# Patient Record
Sex: FEMALE | Race: WHITE
[De-identification: names, ages, dates, MRNs, and addresses within clinical notes are randomized per-mention and may not be internally consistent; named-entity substitution may affect disease eponyms.]

---

## 2019-02-28 ENCOUNTER — HOSPITAL ENCOUNTER (OUTPATIENT)
Dept: HOSPITAL 44 - POD | Age: 49
End: 2019-02-28
Attending: PODIATRIST
Payer: COMMERCIAL

## 2019-02-28 DIAGNOSIS — M79.675: ICD-10-CM

## 2019-02-28 DIAGNOSIS — M79.674: ICD-10-CM

## 2019-02-28 DIAGNOSIS — L60.0: Primary | ICD-10-CM

## 2019-02-28 PROCEDURE — 11750 EXCISION NAIL&NAIL MATRIX: CPT

## 2019-03-05 NOTE — OP CLINIC PROGRESS NOTE
SUBJECTIVE:  Libertad Lloyd is a 49-year-old female who presented for painful 
bilateral great toenail areas. The pain was not located on the sides of the nail
but rather with direct pressure over the nail and made it very uncomfortable in 
her shoes. The patient has thick toenails that are painful and she would like 
them removed permanently. The patient has a history of having one of them 
removed in the past but it grew back still having the same pain. The patient 
does not admit to any fevers, chills, nausea, vomiting, shortness of breath or 
chest pain at this time. 



OBJECTIVE: 

Vitals: Temperature 36.9 degrees Celsius, blood pressure 148/93, heart rate 73, 
respiration rate 18, pain 10/10. 

Vascular: Palpable pulses, DP and PT bilaterally. Capillary refill time was less
than 3 seconds to the great toe bilaterally.  No edema is noted bilaterally.

Dermatologic: There is no obvious erythema or irritation noted on the medial or 
lateral borders of the great toenail, bilateral great toes. There is no open 
lesions nor hyperkeratoses or other skin lesions noted bilaterally.

Musculoskeletal: There is pain on palpation with direct palpation on top of the 
great toenail on both great toes. This is quite tender to the patient. The 
patient does not have any other gross abnormalities noted. Muscle strength is 
5/5 about the ankle and subtalar joint bilaterally. 

Neurologic: Light touch sensation is intact to the toes bilaterally. 



ASSESSMENT AND PLAN:    

1.  Onychomycosis, bilateral great toes.



The risks and benefits of a procedure for a total nail avulsion of bilateral 
great toenails with chemical matrixectomy was discussed with the patient that 
include but are not limited to bleeding and infection. The patient understood 
the risks and benefits and has agreed both by written and verbal consent to go 
forward with the procedure at this time to remove both great toenails 
permanently. She understands that the healing time will be lengthened slightly 
as well. The patient signed the consent and it was placed in the chart. 



PROCEDURE #1:  Total nail avulsion bilateral great toenails with chemical 
matrixectomy. An alcohol swab was utilized to clean the bilateral great toes. 6 
mL of a 1:1 mix of 2% lidocaine plain and 0.5% Marcaine plain were injected in 
each toe totaling 12 mL between the 2 toes. Anesthesia was obtained about both 
toes. A Betadine swab was used to clean both great toes completely. The great 
toes were both exsanguinated and a tourniquet was applied to the base of the 
great toes. Beginning with the left great toe the nail was loosened from its 
eponychium and nail plate borders. The toenail was removed in its entirety in 1 
piece. This was then performed on the right great toenail as well in an 
identical fashion. The wound sites were flushed with a copious amount of normal 
saline and it was confirmed that there was no other nail present in the area. At
this time after washing with a copious amount of normal saline to both great 
toes phenol was then applied with cotton tip applicators in increments of 45 
seconds, 30 seconds, 30 seconds, and a final 30 seconds, totaling 4 applications
to the left great toenail base as well as subsequently performed again on the 
right side. A copious amount of 70% isopropyl alcohol was utilized on the left 
great toenail bed immediately after phenol and prior to application of phenol on
the right great toenail bed. It was also used to then rinse out and dilute the 
right great toe area after that was performed on the right side. Hemostasis was 
then obtained with pressure. The tourniquets were removed and dressings were 
applied consisting of triple antibiotic ointment, 4x4 gauze, 2-inch Maria E and 1-
inch Coban beginning on the great toes and extending onto the distal forefoot to
help hold it on the toe. The patient tolerated the procedures well. No further 
questions were had by the patient. Aftercare instructions were given both 
verbally and by instruction sheet. It should be noted that there was slight 
increased bleeding on the right great toe after she was bandaged so we removed 
the bandages and held pressure a little bit longer and bleeding was very well 
controlled at that time and redressed in the same fashion as above on the right 
great toe. 



The patient had no further questions and the patient is to return to clinic in 1
week after this visit and we will follow up to make sure she is healing 
appropriately. We will then see her 2 weeks later after that and adjust 
instructions as needed for her bandages. The patient knows she may need to do 
bandages with antibiotic ointment and a Band-Aid possibly twice daily but 
definitely at least once daily. The patient understands the rules for showering 
or Epsom salt soaks with regards to washing the toe at the appropriate time and 
she also knows that there are no baths allowed at this time with her feet. 







HAYLEE Jacques.P.M.

(Dictated/Not Signed)



[place]

D:  03/05/19

T:  03/05/19

Job#:  [id number] 

MTDD

## 2019-03-07 ENCOUNTER — HOSPITAL ENCOUNTER (OUTPATIENT)
Dept: HOSPITAL 44 - POD | Age: 49
End: 2019-03-07
Attending: PODIATRIST
Payer: COMMERCIAL

## 2019-03-07 DIAGNOSIS — L03.032: Primary | ICD-10-CM

## 2019-03-07 DIAGNOSIS — Z48.817: ICD-10-CM

## 2019-03-07 DIAGNOSIS — L03.031: ICD-10-CM

## 2019-03-07 PROCEDURE — 99024 POSTOP FOLLOW-UP VISIT: CPT

## 2019-03-08 NOTE — OP CLINIC PROGRESS NOTE
SUBJECTIVE:  Libertad Lloyd is a 49-year-old female who presents today with a 
lot of pain in both great toes. She is 1 week status post bilateral great toe 
total nail avulsions with chemical matrixectomy to both. The patient has been 
doing dressing changes per instructions, however, she has increased in her pain 
and states that she is having some drainage and purulence from the great toes. 
She is in significant pain and in tears when she comes into the office today. 
The patient does not admit to any fevers, chills, nausea, vomiting, shortness of
breath or chest pain or any other complications at home that would cause this. 



OBJECTIVE: 

Vitals: Temperature 98.1 degrees Fahrenheit, heart rate 78, respiration rate 20,
blood pressure 123/91, O2 saturation 98% on room air. 

Vascular: Palpable pulses, DP and PT bilaterally. Capillary refill time was less
than 3 seconds to the great toe bilaterally.  There is fairly moderate edema 
noted in the bilateral great toes mainly at the eponychium area of both great 
toenail proximal edges. 

Dermatologic: There is erythema and swelling noted and warmth to bilateral great
toes more near the proximal edge of the wound base. There is mild drainage that 
is clear/white from certain areas in the nail bed on bilateral great toenail 
beds. We are unable to hardly express due to the amount of pain and tight 
swelling at the eponychium area bilaterally. There are no other concerning areas
on bilateral feet at this time. There is perhaps mild malodor noted bilateral 
great toes. The left seems a little bit worse than the right. 

Musculoskeletal: There is severe pain on palpation noted with direct palpation 
to the nail bed bilateral great toes as well as especially at the area just 
proximal to the nail bed at the eponychium bilaterally, left greater than right.
The area mentioned appears to be very full of swelling/fluid. The nail beds also
have a large amount of crusted material that looks to be blocking the drainage 
underneath the eponychium. This may be harboring infection due to the appearance
of the wounds in the toes. Muscle strength is 5/5 about the ankle and subtalar 
joint bilaterally. 

Neurologic: Light touch sensation is intact to the toes bilateral.



ASSESSMENT AND PLAN:    

1.  Seroma versus abscess, bilateral great toes.

2.  Cellulitis, bilateral great toes.

3.  One week status post bilateral great toenail total nail avulsion with 
chemical matrixectomy. 



The condition of the patient was discussed with her in depth today and I believe
that she is one of the few who returns with either infection or a clogged area 
under the eponychium where the crust in the nailbed from the previous chemical 
matrixectomy seems to have blocked drainage from underneath the eponychium area 
causing a seroma or abscess to form. Due to how painful and obviously irritated 
and possible infection noted to bilateral great toes I believe it was important 
to go ahead and numb up the toes and clean up the nailbed from the crusted 
material and drain any fluid with a needle or underlying the eponychium 
bilateral great toes. The patient had risks and benefits of this incision and 
drainage of bilateral great toes discussed that include but are not limited to 
bleeding and infection. The patient signed consent for bilateral great toe 
incision and drainage bedside procedures, and the consent was placed in the 
chart. The patient had no other questions and wanted this done today as soon as 
possible.



PROCEDURE #1:  An alcohol swab was utilized to clean the base of the left great 
toe and approximately 6 mL of a 1:1 mix of 2% lidocaine plain and 0.5% Marcaine 
plain were injected into the left great toe. Anesthesia was obtained and the toe
was then cleansed with Betadine prep. The toe was then debrided of crusted 
material and probed underneath the eponychium and I did not find any obvious 
drainage of purulence under there. I did not notice any obvious large seroma, 
either. There was continued strong edema in the tissue in the eponychium and a 
25 gauge needle was utilized to puncture this area in 2 or 3 places and with 
palpation and squeezing the fluid inside was expressed and the swelling came 
down. The patient tolerated the procedure well. The wound site was then flushed 
with a copious amount of normal saline and then dressed first with a small piece
of Adaptic that covered the wound base and was pressed underneath the eponychium
to allow drainage and hold that open. This was then dressed with triple 
antibiotic ointment over the top and 4-inch gauze, 2-inch Maria E and 1-inch Coban
starting on the toe and extending onto the distal forefoot to hold it on. The 
patient tolerated the procedure well. Bleeding was controlled with pressure for 
a couple minutes. The patient felt much better at this time. 



PROCEDURE #2:  The identical procedure was performed on the right great toe as 
procedure #1. The patient tolerated that procedure as well and the same findings
were found there as the first without any obvious purulence. 



Due to the amount of pain the patient has been having as well as possible 
infection, 2 prescriptions were sent with the patient including Augmentin 
875/125 mg 1 tablet by mouth twice daily for 10 days as well as tramadol. I just
received word that the pharmacy would not accept the tramadol prescription 
because I am not under Medicaid yet and therefore a separate prescription has 
been sent in by a primary care provider here, Dr. Diaz. That prescription should
be changed to tramadol 50 mg 1-2 tablets by mouth every 6 hours as needed for 
pain. The patient should receive 20 of them. 



The patient feels much better at the end of our visit and is grateful for how we
were able to add her on this morning and take care of her. She denies any other 
pain or problems at this time and she will return to clinic in 1 week or 
slightly less at the health clinic next door in order to have a follow-up and 
make sure she is doing better. The patient was instructed to remove the Adaptic 
nonadherent gauze tomorrow sometime in the afternoon or evening and may wash her
toes daily with soap and water and dry it and apply antibiotic ointment and a 
Band-Aid daily. The patient understands this and we will see her in about 1 week
or slightly less. 









LONNIE JacquesPYanyMYany

(Dictated/Not Signed)



[place]

D:  03/07/19

T:  03/08/19

Job#:  [id number] 

MTDD

## 2019-03-21 ENCOUNTER — HOSPITAL ENCOUNTER (OUTPATIENT)
Dept: HOSPITAL 44 - POD | Age: 49
End: 2019-03-21
Attending: PODIATRIST
Payer: COMMERCIAL

## 2019-03-21 DIAGNOSIS — G89.28: ICD-10-CM

## 2019-03-21 DIAGNOSIS — T81.89XD: Primary | ICD-10-CM

## 2019-03-21 PROCEDURE — 11720 DEBRIDE NAIL 1-5: CPT

## 2019-03-25 NOTE — OP CLINIC PROGRESS NOTE
SUBJECTIVE:  Libertad Lloyd is a 49-year-old female presenting today for 
follow-up of bilateral great toenail avulsions with chemical matrixectomy. She 
has been having delayed wound healing and continues to have extensive severe 
pain on the right great toe but some pain noted still on the left toe. She is 
slow to heal. She does not admit to any fevers, chills, nausea, vomiting, 
shortness of breath or chest pain. She admits she is having a little bit of 
issues at work with her boss having a hard time seeing her hurting all the time.
She struggles to work but is still continuing to work on her feet several hours 
a day. 



OBJECTIVE: 

Vitals: Temperature 98.2 degrees Fahrenheit, heart rate 101, respiration rate 
20, blood pressure 140/86. Pain: Right is 7/10, left great toe is 3/10. 

Vascular: 2+ DP pulses bilaterally, and between 1 and 2+ PT pulses bilaterally. 
Capillary refill time was less than 3 seconds to the toes bilaterally. Mild 
edema is still noted at the bilateral hallux, especially on the proximal lateral
portion of the eponychium of the right hallux. 

Dermatologic: There is no erythema noted but there is slight red discoloration 
due to healing tissues underneath/irritation. This is noted at the proximal 
eponychium of both great toes. The wound bases of the nail beds are more raw 
still today which is better than before. They are not scabbing and they do not 
have any evidence of purulence or malodor noted. The left appears to be looking 
a little more healed this time as well as the right. There is mild yellow slough
which was debrided today again. [facility]

Musculoskeletal: There is still pain on palpation noted on bilateral great 
toenail beds especially at the proximal lateral aspect of the eponychium of the 
right great toe. The pain is overall improved today, though, I believe since 
last time. There are no other gross abnormalities noted, bilateral feet.

Neurologic: Light touch sensation is intact to the toes bilaterally.



ASSESSMENT AND PLAN:    

1.  Postprocedure pain. 

2.  Delayed surgical wound healing, subsequent encounter; T81.89XD, bilateral 
great toenail beds. 



PROCEDURE #1 and #2:  The wound beds of the great toenail beds, left and right 
were debrided with a curette today down to raw clean tissue. There was mild 
bleeding noted. The patient tolerated the procedure well. 2% lidocaine jelly was
applied for 15 minutes prior to beginning of the debridement. The wounds were 
then dressed with Santyl ointment, 4x4 gauze, 2-inch Maria E and 1-inch Coban 
beginning on the toe and ending on the distal forefoot bilaterally. 



The patient knows that this is still going to take some time to heal but there 
is no evidence of blood flow being an issue and healing at this time. I believe 
that we need to continue treating it as we are and we will see her again next 
week on Monday and likely on Thursday and then we can likely push her out at 
least 1 week from there. The patient is striving to be patient with the delayed 
healing and she understands that we are doing everything we can to try and get 
this to heal soon. She was requesting a bit better pain medication as the 
ibuprofen is not cutting it for her. I am willing to do one last tramadol 
prescription and that will be it. The patient will get that at [facility]. The
patient will return to the clinic on Monday for follow-up of both great toenail 
beds. 







KADY JacquesM.

(Dictated/Not Signed)



[place]

D:  03/21/19

T:  03/25/19

Job#:  [id number] 

MTDD

## 2019-07-08 ENCOUNTER — HOSPITAL ENCOUNTER (OUTPATIENT)
Dept: HOSPITAL 44 - RAD | Age: 49
End: 2019-07-08
Attending: NURSE PRACTITIONER
Payer: COMMERCIAL

## 2019-07-08 DIAGNOSIS — M25.551: ICD-10-CM

## 2019-07-08 DIAGNOSIS — M25.511: Primary | ICD-10-CM

## 2019-07-08 DIAGNOSIS — M54.5: ICD-10-CM

## 2019-07-08 PROCEDURE — 73030 X-RAY EXAM OF SHOULDER: CPT

## 2019-07-08 PROCEDURE — 73502 X-RAY EXAM HIP UNI 2-3 VIEWS: CPT

## 2019-07-08 PROCEDURE — 72100 X-RAY EXAM L-S SPINE 2/3 VWS: CPT

## 2019-07-08 NOTE — DIAGNOSTIC IMAGING REPORT
RAMSEY HUERTA (NP) - OP 

[facility]

[address] [facility] [address] [address]

 

 

 

 

Report Submission Date: 2019 4:59:21 PM CDT

Patient       Study

Name:   RAH MCCURDY       Date:   2019 2:46:00 PM CDT

MRN:   [id number]       Modality Type:   DX

Gender:   F       Description:   SHOULDER 2 VIEWS OR MORE

:   74       Institution:   [facility]

Physician:   RAMSEY HUERTA (RICARDO) - OP

     Accession:    [id number]

 

 

Right shoulder 



History:  Shoulder pain 



Three views of the right shoulder were obtained which demonstrate no evidence 
for acute fracture or dislocation.  There is widening of the AC joint which 
could be postsurgical in nature.  Please correlate clinically. 



Impression: 



There is abnormal widening of the AC joint up to 1.8 cm.  This finding could 
indicate AC joint separation but also be postsurgical.  Please correlate 
clinically.  If indicated, dedicated images of the AC joints with weight-bearing
could be obtained. 



Otherwise, no osseous abnormality.

 

Electronically signed on 2019 4:59:21 PM CDT by:

Moni HINES

## 2019-07-08 NOTE — DIAGNOSTIC IMAGING REPORT
RAMSEY HUERTA (NP) - OP 

[facility]

[address] [facility].[address] [address]

 

 

 

 

Report Submission Date: 2019 5:00:58 PM CDT

Patient       Study

Name:   RAH MCCURDY       Date:   2019 2:52:00 PM CDT

MRN:   [id number]       Modality Type:   DX

Gender:   F       Description:   RT HIP 2VIEW COMPLETE

:   74       Institution:   [facility]

Physician:   RAMSEY HUERTA (RICARDO) - OP

     Accession:    [id number]

 

 

Right hip 



History:  Pain 



AP pelvis and frogleg lateral projection of the right hip demonstrate no osseous
abnormalities.  There are no degenerative findings.  The SI joints are patent. 



Impression: 



No osseous abnormality.

 

Electronically signed on 2019 5:00:58 PM CDT by:

Moni HINES

## 2019-07-08 NOTE — DIAGNOSTIC IMAGING REPORT
RAMSEY HUERTA (NP) - OP 

[facility]

[address] [address] [facility].[address] [address]

 

 

 

 

Report Submission Date: 2019 4:59:58 PM CDT

Patient       Study

Name:   RAH MCCURDY       Date:   2019 2:58:00 PM CDT

MRN:   [id number]       Modality Type:   DX

Gender:   F       Description:   L SPINE 2 OR 3 VIEWS

:   74       Institution:   [facility]

Physician:   RAMSEY HUERTA (NP) - OP

     Accession:    [id number]

 

 

Examination: Plain film lumbar spine 



History: LOW BACK PAIN 



Findings: 3 views of the lumbar spine demonstrate normal height. Scattered 
osteophytes.   No anterior compression. No soft tissue abnormalities. 



Impression: Mild degenerative changes. No acute osseous process.

 

Electronically signed on 2019 4:59:58 PM CDT by:

Chaim HINES

## 2019-07-17 ENCOUNTER — HOSPITAL ENCOUNTER (EMERGENCY)
Dept: HOSPITAL 44 - ED | Age: 49
Discharge: HOME | End: 2019-07-17
Payer: COMMERCIAL

## 2019-07-17 VITALS
SYSTOLIC BLOOD PRESSURE: 173 MMHG | DIASTOLIC BLOOD PRESSURE: 75 MMHG | SYSTOLIC BLOOD PRESSURE: 173 MMHG | DIASTOLIC BLOOD PRESSURE: 75 MMHG

## 2019-07-17 DIAGNOSIS — K59.00: Primary | ICD-10-CM

## 2019-07-17 DIAGNOSIS — R11.0: ICD-10-CM

## 2019-07-17 LAB
BASOPHILS NFR BLD: 0.5 % (ref 0–1.5)
EGFR (NON-AFRICAN): > 60
MCV RBC AUTO: 96 FL (ref 80–100)
NEUTROPHILS #: 3.5 # K/UL (ref 1.4–7.7)

## 2019-07-17 PROCEDURE — 96375 TX/PRO/DX INJ NEW DRUG ADDON: CPT

## 2019-07-17 PROCEDURE — 85025 COMPLETE CBC W/AUTO DIFF WBC: CPT

## 2019-07-17 PROCEDURE — 74177 CT ABD & PELVIS W/CONTRAST: CPT

## 2019-07-17 PROCEDURE — 83690 ASSAY OF LIPASE: CPT

## 2019-07-17 PROCEDURE — 96374 THER/PROPH/DIAG INJ IV PUSH: CPT

## 2019-07-17 PROCEDURE — S1016 NON-PVC INTRAVENOUS ADMINIST: HCPCS

## 2019-07-17 PROCEDURE — 80053 COMPREHEN METABOLIC PANEL: CPT

## 2019-07-17 PROCEDURE — 96361 HYDRATE IV INFUSION ADD-ON: CPT

## 2019-07-17 PROCEDURE — 99284 EMERGENCY DEPT VISIT MOD MDM: CPT

## 2019-07-17 NOTE — ED PHYSICIAN DOCUMENTATION
Abdominal Pain





- HISTORIAN


Historian: patient





- HPI


Stated Complaint: Abdominal pain with nausea


Chief Complaint: Abdominal Pain


Additonal Information: 


Patient is a 49-year-old female who presents to the ER with c/o abdominal pain. 

She states that she had n/v/d that started last week and then went away.  

Abdominal pain restarted 3 days ago and has had diarrhea x 1 week; she took 

immodium for the diarrhea- she c/o nausea.  She states that she has a history of

pancreatitis; no hx of hernias. 


Onset: days ago


Duration: waxing, waning


Timing: worse


Context: denies: out of country travel, bad food


Severity: moderate


Quality: pain, burning, cramping


Associated Symptoms: nausea, vomiting, diarrhea


Exacerbated by: upright position


Relieved by: remaining still





- ROS


CONST: no problems


GI/: none


CVS/RESP: none


EYES/ENT: none


MS/SKIN/LYMPH: none


NEURO/PSYCH: none





- SOCIAL HX


Smoking History: less than 1 pack/day


Alcohol Use: rarely


Drug Use: other (Hx of drug use "quit ")





- FAMILY HX


Family History: none





- PAST HX


Past History: bladder infection, other (depression, anxiety)


Ischemic Bowel Risk Factors: other (hx of pancreatitis)


Other History: hepatitis (recently treated for hepatitis)


Surgeries/Procedures: , other (RTC and right elbow)


Immunizations: UTD


Home Medications: 


                                Ambulatory Orders











 Medication  Instructions  Recorded


 


Clonazepam [Klonopin] 1 mg PO BID  u2 19


 


Quetiapine Fumarate [Seroquel] 50 mg PO DAILY  u2 19


 


Sertraline HCl [Zoloft] 200 mg PO DAILY  u2 19


 


Baclofen 20 mg PO DAILY 19


 


Meloxicam 15 mg PO DAILY 19











Allergies/Adverse Reactions: 


                                    Allergies











Allergy/AdvReac Type Severity Reaction Status Date / Time


 


oxazepam [From Serax] AdvReac Unknown Rash Verified 19 15:39














- VITAL SIGNS


Vital Signs: 


                                   Vital Signs











Temp Pulse Resp BP Pulse Ox


 


 98.5 F   85   20   173/75   99 


 


 19 14:50  19 14:50  19 14:50  19 14:50  19 14:50














- REVIEWED ASSESSMENTS


Nursing Assessment  Reviewed: Yes


Vitals Reviewed: Yes





Progress





- Progress


Progress: 


16:10 Discussed results with patient- explained about constipation- patient 

wanting narcotic- explained the side effects of narcotics including 

constipation- discussed increasing water and fiber in diet.





ED Results Lab/Radiology





- Lab Results


Lab Results: 


                                   Lab Results











  19





  15:26 15:10


 


WBC    6.60 K/ul K/ul





    (4.00-12.00) 


 


RBC    4.06 M/ul M/ul





    (3.90-5.20) 


 


Hgb    13.2 g/dL g/dL





    (11.5-16.0) 


 


Hct    39.2 % %





    (34.5-46.5) 


 


MCV    96.0 fl fl





    (80.0-100.0) 


 


MCH    32.6 pg pg





    (28.0-34.0) 


 


MCHC    33.8 g/dL g/dL





    (30.0-36.0) 


 


RDW    11.0 % L %





    (11.3-14.3) 


 


Plt Count    202 K/mm3 K/mm3





    (130-400) 


 


Neut % (Auto)    53.8 % %





    (39.0-79.0) 


 


Lymph % (Auto)    34.4 % %





    (16.0-50.0) 


 


Mono % (Auto)    8.1 % %





    (0.0-11.0) 


 


Eos % (Auto)    3.2 % %





    (0.0-6.8) 


 


Baso % (Auto)    0.5 % %





    (0.0-1.5) 


 


Neut # (Auto)    3.5 # k/uL # k/uL





    (1.4-7.7) 


 


Lymph # (Auto)    2.3 # k/uL # k/uL





    (0.6-4.0) 


 


Mono # (Auto)    0.5 # k/uL # k/uL





    (0.0-0.9) 


 


Eos # (Auto)    0.2 # k/uL # k/uL





    (0.0-0.6) 


 


Baso # (Auto)    0.0 # k/uL # k/uL





    (0.0-0.5) 


 


Sodium  141 mmol/L mmol/L  





   (137-145)  


 


Potassium  4.0 mmol/L mmol/L  





   (3.5-5.1)  


 


Chloride  111 mmol/L H mmol/L  





   ()  


 


Carbon Dioxide  21 mmol/L L mmol/L  





   (22-30)  


 


Anion Gap  13.0 mmol/L H mmol/L  





   (3-11)  


 


BUN  14 mg/dL mg/dL  





   (7-17)  


 


Creatinine  0.76 mg/dL mg/dL  





   (0.52-1.04)  


 


Estimated Creat Clear  127   





   


 


Est GFR ( Amer)  > 60   





  (60 - ) 


 


Est GFR (Non-Af Amer)  > 60   





  (60 - ) 


 


Glucose  107 mg/dL H mg/dL  





   ()  


 


Calcium  9.7 mg/dL mg/dL  





   (8.4-10.2)  


 


Total Bilirubin  0.3 mg/dL mg/dL  





   (0.2-1.3)  


 


AST  46 U/L U/L  





   (15-46)  


 


ALT  22 U/L U/L  





   (13-69)  


 


Alkaline Phosphatase  85 U/L U/L  





   ()  


 


Total Protein  8.2 g/dL g/dL  





   (6.3-8.2)  


 


Albumin  4.6 g/dL g/dL  





   (3.5-5.0)  


 


Lipase  62 U/L U/L  





   ()  














- Radiology


Radiology Impressions: 


Exam:  CT abdomen and pelvis with contrast. 





History:  Lower abdominal pain. 





Axial images through the abdomen and pelvis after IV infusion of 90 cc Omnipaque

350 is submitted along with sagittal and coronal reformatted images. 





The visualized lower lung fields are clear.  No free intraperitoneal air is 

identified. 





The gallbladder is distended without stones.  The liver, spleen and pancreas are

normal attenuation and enhancement.  The adrenal glands are normal 

configuration.  The abdominal aorta is of normal caliber.  No periaortic 

lymphadenopathy is identified. 





Both kidneys are normal attenuation and enhancement without hydronephrosis or 

hydroureter.  The urinary bladder is distended without intrinsic filling defect.

 The uterus is not visualized.  No adnexal masses or free cul-de-sac fluid is 

identified. 





The small bowel is of normal caliber.  Air and stool seen throughout the large 

intestine.  No inflammatory changes in the mesentery or ascites is identified.  

No bony abnormalities are identified. 





Impression: 


No hydronephrosis or hydroureter is identified.  No renal or ureteral stones are

identified.   


Nonspecific bowel gas pattern.   


No inflammatory changes in the mesentery or ascites is identified.





- Orders


Orders: 


                                    ED Orders











 Category Date Time Status


 


 Place IV Lock 1T Care  19 15:11 Active


 


 CT ABD & PELVIS W/ CON Stat Exams  19 Completed


 


 CBC/PLATELET/DIFF Routine Lab  19 15:10 Completed


 


 CMP Routine Lab  19 15:26 Completed


 


 LIPASE Stat Lab  19 15:26 Completed


 


 URINALYSIS Routine Lab  19 Ordered


 


 0.9 % Sodium Chloride [Normal Saline] 1,000 ml Med  19 15:11 Discontinued





 IV Q1H   


 


 Ketorolac Tromethamine [Toradol] Med  19 15:11 Discontinued





 30 mg IV NOW ONE   


 


 Magnesium Citrate [Citrate of Magnesia] Med  19 16:11 Discontinued





 296 ml PO NOW ONE   


 


 Ondansetron HCl/Pf [Zofran] Med  19 15:11 Discontinued





 4 mg IVP NOW ONE   














Abdominal Pain Physical Exam





- Physical Exam


General Appearance: alert, mild distress


EENT: eye inspection normal, ENT inspection normal, pharynx normal, no signs of 

dehydration, BANDAR


NECK: normal inspection, supple


RESPIRATORY: chest non-tender, breath sounds normal


CVS: heart sounds normal, equal pulses


ABDOMEN: soft, normal bowel sounds, tenderness


BACK: normal inspection


SKIN: warm/dry, normal color


EXTREMITIES: non-tender, normal range of motion, no evidence of injury


NEURO: oriented X3, CN's nml as tested, motor nml, sensation nml, mood/affect 

nml, cognition normal


Vital Signs: 


                                   Vital Signs











Temp Pulse Resp BP Pulse Ox


 


 98.5 F   85   20   173/75   99 


 


 19 14:50  19 14:50  19 14:50  19 14:50  19 14:50














Discharge


Clincal Impression: 


 Constipation





Referrals: 


Chrystal Berg PRN [Primary Care Provider] - 2 Days


Additional Instructions: 


Increase water intake


Increase fiber


Lots of walking


Take Mag Citrate (remainder of bottle in 2 hours)-  given in the ER


Follow up with PCP next week for re-evaluation


Condition: Good


Disposition: 01 HOME, SELF-CARE


Decision to Admit: NO


Decision Time: 16:32

## 2019-07-17 NOTE — DIAGNOSTIC IMAGING REPORT
[facility] 

[facility]

[address] [address] [facility] [address]

[place], [place]. [address]

 

 

 

 

Report Submission Date: 2019 4:03:32 PM CDT

Patient       Study

Name:   RAH MCCURDY       Date:   2019 3:40:13 PM CDT

MRN:   [id number]       Modality Type:   CT\SR

Gender:   F       Description:   ABD/PELV W/ CON

:   74       Institution:   [facility]

Physician:   [facility]

     Accession:    [id number]

 

 

Exam:  CT abdomen and pelvis with contrast. 



History:  Lower abdominal pain. 



Axial images through the abdomen and pelvis after IV infusion of 90 cc Omnipaque
350 is submitted along with sagittal and coronal reformatted images. 



The visualized lower lung fields are clear.  No free intraperitoneal air is 
identified. 



The gallbladder is distended without stones.  The liver, spleen and pancreas are
normal attenuation and enhancement.  The adrenal glands are normal 
configuration.  The abdominal aorta is of normal caliber.  No periaortic 
lymphadenopathy is identified. 



Both kidneys are normal attenuation and enhancement without hydronephrosis or 
hydroureter.  The urinary bladder is distended without intrinsic filling defect.
 The uterus is not visualized.  No adnexal masses or free cul-de-sac fluid is 
identified. 



The small bowel is of normal caliber.  Air and stool seen throughout the large 
intestine.  No inflammatory changes in the mesentery or ascites is identified.  
No bony abnormalities are identified. 



Impression: 

No hydronephrosis or hydroureter is identified.  No renal or ureteral stones are
identified.   

Nonspecific bowel gas pattern.   

No inflammatory changes in the mesentery or ascites is identified.

 

Electronically signed on 2019 4:03:32 PM CDT by:

Minh HINES

## 2019-11-02 ENCOUNTER — HOSPITAL ENCOUNTER (EMERGENCY)
Dept: HOSPITAL 44 - ED | Age: 49
Discharge: HOME | End: 2019-11-02
Payer: COMMERCIAL

## 2019-11-02 VITALS — DIASTOLIC BLOOD PRESSURE: 66 MMHG | SYSTOLIC BLOOD PRESSURE: 127 MMHG
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
## 2019-11-02 DIAGNOSIS — S71.101A: Primary | ICD-10-CM
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
## 2019-11-02 PROCEDURE — 96372 THER/PROPH/DIAG INJ SC/IM: CPT

## 2019-11-02 PROCEDURE — 99283 EMERGENCY DEPT VISIT LOW MDM: CPT

## 2019-11-02 PROCEDURE — 99284 EMERGENCY DEPT VISIT MOD MDM: CPT

## 2019-11-02 RX ADMIN — HYDROCODONE BITARTRATE AND ACETAMINOPHEN ONE EACH: 10; 325 TABLET ORAL at 14:39

## 2019-11-02 RX ADMIN — CLINDAMYCIN PHOSPHATE ONE MG: 150 INJECTION, SOLUTION INTRAVENOUS at 14:39

## 2019-11-02 NOTE — ED PHYSICIAN DOCUMENTATION
Abscess





- HISTORIAN


Historian: patient





- HPI


Stated Complaint: sore on right leg


Chief Complaint: Abscess (Right Thigh)


Additional Information: 


Patient presents to the ER with c/o "sore on the right thigh".  Patient is 

unable to sit still, smacking her lips, fidgeting; appears to be under the 

influence of "something".  She states that she hit her leg on a screw 3 weeks 

ago.  She states that it has been the same since it happened. However, she has 

seen her PCP 3 times since it has happened and has not mentioned. She just looks

at me when I asked "why did you not discuss this with your PCP?.  Patient has a 

2cm open hole to the right thigh- there is surrounding pinkness around the area;

minimal infection noted due to possible use of Augmentin given by PCP for resp. 

infection.  


Onset: other (3 weeks ago)


Timing: still present


Duration: persistent since


Location: RLE (right thigh)


Quality: painful


Identified Cause?: Yes (screw)


When Did Symptoms Start: 10/14/19


Where: home


Context: Medication Exposure: none


Context: Food Exposure: none





- ROS


CONST: recent illness (upper respiratory infection)


CVS/RESP: none


EYES/ENT: none


GI/: none


MS/SKIN/LYMPH: other (open abscess to the right thigh)


NEURO/PSYCH: none





- PAST HX


Past History: other (hx of pancreatitis, anxiety, depression)


Other History: other (hx of hepatitis; treated)


Surgeries/Procedures: Yes (, RTC, right elbow)


Immunizations: UTD


Allergies/Adverse Reactions: 


                                    Allergies











Allergy/AdvReac Type Severity Reaction Status Date / Time


 


oxazepam [From Serax] AdvReac Unknown Rash Verified 19 14:13











Home Medications: 


                                Ambulatory Orders











 Medication  Instructions  Recorded


 


Clonazepam [Klonopin] 1 mg PO BID  u2 19


 


Quetiapine Fumarate [Seroquel] 50 mg PO DAILY  u2 19


 


Sertraline HCl [Zoloft] 200 mg PO DAILY  u2 19


 


Baclofen 20 mg PO DAILY 19


 


Clindamycin HCl [Cleocin HCl] 300 mg PO QID #40 capsule 19














- SOCIAL HX


Smoking History: greater than 1 pack/day


Alcohol Use: rarely


Drug Use: other (patient denies, quit in )





- FAMILY HX


Family History: none





- VITAL SIGNS


Vital Signs: 


                                   Vital Signs











Temp Pulse Resp BP Pulse Ox


 


 96.8 F L  106 H  20   127/66   96 


 


 19 14:44  19 14:44  19 14:44  19 14:44  19 14:44














- REVIEWED ASSESSMENTS


Nursing Assessment  Reviewed: Yes


Vitals Reviewed: Yes





Procedures


Progress: 


RN irrigated wound with 100 ml of NS- 


Packed with 1" of the 1" iodofoam packing (no tunneling noted- no infectious 

discharge noted)





ED Results Lab/Radiology





- Orders


Orders: 


                                    ED Orders











 Category Date Time Status


 


 Clindamycin Phosphate [Cleocin] Med  19 14:12 Discontinued





 300 mg IM NOW ONE   


 


 HYDROcodone /APAP 10/325 [Norco 10/325] Med  19 14:14 Discontinued





 1 each PO NOW ONE   














Abscess Physical Exam





- EXAM


General Appearance: alert, mild distress


Skin: warm,dry, abscess (2cm open abscess to the right thigh)


Location: extremities (right thigh)


Character: linear (with surrounding pinkness; circular in nature)


Symptoms: tenderness, well defined border


Extremities: No: non-tender


Respiratory: breath sounds normal


CVS: heart sounds nml


Neuro/Psych: oriented x3, mood/affect nml (talking fast, fidgeting, unable to 

sit still)





Discharge


Clincal Impression: 


 Open wound of right thigh





Prescriptions: 


Clindamycin HCl [Cleocin HCl] 300 mg PO QID #40 capsule


Referrals: 


Chrystal Berg, PRN [Primary Care Provider] - 2 Days


Additional Instructions: 


Wound irrigated and packed in the ER


Keep packing in; FOLLOW UP WITH PCP ON 19 FOR RE-EVALUATION!


Do not pick at it or try to drain it


Good handwashing


Take Clindamycin 350 mg by mouth 4 times a day for 10 days (TAKE ALL OF 

ANTIBIOTIC)


It is important to follow up with PCP 


Alternate Ibuprofen and Tylenol as needed for pain


Take Tramadol ONLY for breakthrough pain (50mg by mouth every 6 hrs prn 

breakthrough pain #10)








Patient states that she already has appointment with PCP on 19


Condition: Good


Disposition: 01 HOME, SELF-CARE


Decision to Admit: NO


Decision Time: 14:50